# Patient Record
Sex: MALE | Race: WHITE | URBAN - METROPOLITAN AREA
[De-identification: names, ages, dates, MRNs, and addresses within clinical notes are randomized per-mention and may not be internally consistent; named-entity substitution may affect disease eponyms.]

---

## 2017-01-30 DIAGNOSIS — I10 ESSENTIAL HYPERTENSION: Primary | ICD-10-CM

## 2017-01-30 RX ORDER — LISINOPRIL 20 MG/1
20 TABLET ORAL DAILY
Qty: 90 TABLET | Refills: 1 | Status: SHIPPED | OUTPATIENT
Start: 2017-01-30 | End: 2018-04-11

## 2017-02-17 ENCOUNTER — DOCUMENTATION ONLY (OUTPATIENT)
Dept: VASCULAR SURGERY | Facility: CLINIC | Age: 72
End: 2017-02-17

## 2017-04-22 ENCOUNTER — OFFICE VISIT (OUTPATIENT)
Dept: FAMILY MEDICINE | Facility: CLINIC | Age: 72
End: 2017-04-22

## 2017-04-22 DIAGNOSIS — K11.7 XEROSTOMIA: ICD-10-CM

## 2017-04-22 DIAGNOSIS — Z00.00 MEDICARE ANNUAL WELLNESS VISIT, SUBSEQUENT: Primary | ICD-10-CM

## 2017-04-22 DIAGNOSIS — Z13.220 SCREENING CHOLESTEROL LEVEL: ICD-10-CM

## 2017-04-22 DIAGNOSIS — Z12.5 SCREENING FOR PROSTATE CANCER: ICD-10-CM

## 2017-04-22 DIAGNOSIS — I10 ESSENTIAL HYPERTENSION, BENIGN: ICD-10-CM

## 2017-04-22 NOTE — MR AVS SNAPSHOT
After Visit Summary   4/22/2017    Jamison Lyman    MRN: [id number]           Patient Information     Date Of Birth          1945        Visit Information        Provider Department      4/22/2017 8:00 AM Alberto Tomlin MD [facility]        Today's Diagnoses     Medicare annual wellness visit, subsequent    -  1    Screening cholesterol level        Screening for prostate cancer        Xerostomia        Essential hypertension, benign          Care Instructions      Preventive Health Recommendations:       Male Ages 65 and over    Yearly exam:             See your health care provider every year in order to  o   Review health changes.   o   Discuss preventive care.    o   Review your medicines if your doctor has prescribed any.    Talk with your health care provider about whether you should have a test to screen for prostate cancer (PSA).    Every 3 years, have a diabetes test (fasting glucose). If you are at risk for diabetes, you should have this test more often.    Every 5 years, have a cholesterol test. Have this test more often if you are at risk for high cholesterol or heart disease.     Every 10 years, have a colonoscopy. Or, have a yearly FIT test (stool test). These exams will check for colon cancer.    Talk to with your health care provider about screening for Abdominal Aortic Aneurysm if you have a family history of AAA or have a history of smoking.  Shots:     Get a flu shot each year.     Get a tetanus shot every 10 years.     Talk to your doctor about your pneumonia vaccines. There are now two you should receive - Pneumovax (PPSV 23) and Prevnar (PCV 13).    Talk to your doctor about a shingles vaccine.     Talk to your doctor about the hepatitis B vaccine.  Nutrition:     Eat at least 5 servings of fruits and vegetables each day.     Eat whole-grain bread, whole-wheat pasta and brown rice instead of white grains and rice.     Talk to your doctor about Calcium and  Vitamin D.   Lifestyle    Exercise for at least 150 minutes a week (30 minutes a day, 5 days a week). This will help you control your weight and prevent disease.     Limit alcohol to one drink per day.     No smoking.     Wear sunscreen to prevent skin cancer.     See your dentist every six months for an exam and cleaning.     See your eye doctor every 1 to 2 years to screen for conditions such as glaucoma, macular degeneration and cataracts.        Follow-ups after your visit        Follow-up notes from your care team     Return in about 1 year (around 4/22/2018) for Physical Exam.      Who to contact     Please call your clinic at [phone] to:    Ask questions about your health    Make or cancel appointments    Discuss your medicines    Learn about your test results    Speak to your doctor   If you have compliments or concerns about an experience at your clinic, or if you wish to file a complaint, please contact [facility] Patient Relations at [phone] or email us at [email or web address]         Additional Information About Your Visit        [employer] Information     [employer] gives you secure access to your electronic health record. If you see a primary care provider, you can also send messages to your care team and make appointments. If you have questions, please call your primary care clinic.  If you do not have a primary care provider, please call [phone] and they will assist you.      [employer] is an electronic gateway that provides easy, online access to your medical records. With [employer], you can request a clinic appointment, read your test results, renew a prescription or communicate with your care team.     To access your existing account, please contact your [facility] or call [phone] for assistance.        Care EveryWhere ID     This is your Care EveryWhere ID. This could be used by other organizations to access your  [facility] medical records  [id number]         Blood Pressure from Last 3 Encounters:   12/03/16 111/71   11/05/16 123/78   01/14/16 119/80    Weight from Last 3 Encounters:   11/05/16 198 lb (89.8 kg)   01/14/16 196 lb 8 oz (89.1 kg)   02/03/15 199 lb (90.3 kg)               Primary Care Provider Office Phone # Fax #    Alberto Tomlin [phone]       [facility]  [facility] [address] [facility] [address]        Thank you!     Thank you for choosing [facility]  for your care. Our goal is always to provide you with excellent care. Hearing back from our patients is one way we can continue to improve our services. Please take a few minutes to complete the written survey that you may receive in the mail after your visit with us. Thank you!             Your Updated Medication List - Protect others around you: Learn how to safely use, store and throw away your medicines at www.disposemymeds.org.          This list is accurate as of: 4/22/17 11:59 PM.  Always use your most recent med list.                   Brand Name Dispense Instructions for use    ANDROGEL PUMP TD      Place  onto the skin daily as needed.       ascorbic acid 500 MG tablet    VITAMIN C     Take 1 tablet by mouth daily.       aspirin 81 MG tablet      Take 1 tablet by mouth daily.       bromocriptine 2.5 MG tablet    PARLODEL     Take 2 tablets by mouth daily       lisinopril 20 MG tablet    PRINIVIL/ZESTRIL    90 tablet    Take 1 tablet (20 mg) by mouth daily       sildenafil 50 MG cap/tab    VIAGRA    24 tablet    Take 1 tablet (50 mg) by mouth daily as needed for erectile dysfunction       ZANTAC PO      Take 150 mg by mouth

## 2017-04-22 NOTE — PROGRESS NOTES
"CHIEF COMPLAINT:  Medicare annual wellness visit, subsequent.      HISTORY OF PRESENT ILLNESS:  Mr. Lyman is a 71-year-old gentleman here for the above.  He wanted to come in and bring me up-to-date with his musculoskeletal concerns as well as talking to me a bit about his dry mouth.  Since he was going to do that anyway, he was hoping to get a full physical exam.  He has had some left rotator cuff pain for a few months now.  It all started when he was yanking a weed out of his yard.  About 8 months ago, he initially went to some physical therapy but did not take it very seriously.  More recently, for the last 3-1/2 weeks, he has been doing it \"religiously\" and thinks it is making a difference.  He will likely make an appointment with Dr. Nhan Mcfarland or someone in his group to have further assessment done.      He and his wife were in [place] for part of the winter.  He has had some low back pain for a while and that is actually getting better.  He would estimate that it is now 85% of normal.  He has been taking some naproxen almost every day and that really helps.  He and his wife will be starting with a significant remodeling project at their house in about 48 hours, so they have been unusually physically busy.      While they were in [place], sometime in March, Jamison decided to get off his omeprazole which he had been on for years.  He and I had previously discussed this.  He changed over to ranitidine and was taking 150 mg tablets, 2 together twice daily for a 600 mg total.  Not that long after that, he developed a significant dry mouth.  He decided to cut back on the ranitidine on 03/28 and he does not think there has been much of a difference.  He wonders if his dry mouth is due to the medication.      From a Medicare question standpoint, he has no problems with his activities of daily living.  He has had no falls at home.  He is not having any depressive symptoms.  His memory is quite good.      FAMILY " HISTORY UPDATE:  Reminds us that his father  at age 78, not long after being hit by a car, likely from metastatic prostate cancer.      FAMILY HISTORY:  Mother lived to be 91, but  in an apartment fire.      HEALTHCARE MAINTENANCE:  He wears glasses and his eye care is very up-to-date.  Hearing is excellent.  Dental care is up-to-date.  Blood pressure was 133/136-84/85 (the lower readings were the second readings).  He is under 140/90.  We will continue with his lisinopril 20 mg daily.  Colonoscopy was performed in .  It was recommended that he repeat that in 2018.  We will check a PSA 50 at his convenience.  We will also check a lipid panel and a glucose.      REVIEW OF SYSTEMS:  A 10-point review of systems is negative.      SOCIAL AND PAST SURGICAL HISTORY:  Reviewed and unchanged.      OBJECTIVE:  Jamison is in absolutely no distress.  Affect is upbeat.  He is completely alert and oriented x3.  His voice does have a slightly dry eye quality to it.  Examination reveals the following:  Blood pressure readings as mentioned.  He is afebrile.  He is breathing comfortably.  He is clearly comfortable at rest.   HEENT:  Head is normocephalic, atraumatic.  Ears are free of any cerumen.  The TMs look fine.  There is no pain with palpation of the frontal or maxillary sinuses.  Eyes are grossly normal.  Nose is free congestion or discharge.  Teeth in good repair.  Mucous membranes are moist.  Throat looks benign.  Neck is supple without adenopathy or thyromegaly.  No carotid bruits are heard, no JVD.   BACK:  Smooth and straight.  No pain to percussion.  No CVA tenderness.   LUNGS:  Clear to auscultation bilaterally.   HEART:  Regular rate and rhythm without murmur.   ABDOMEN:  Benign.   EXTREMITIES:  Ankles are free of any edema.   SKIN:  Warm and dry.      LABORATORY DATA:  Labs that will be drawn at a later date include a PSA 50, lipid panel and a basic metabolic panel.      ASSESSMENT/PLAN:   1.  Medicare  annual wellness visit, subsequent.   2.  Screening for prostate cancer and hyperlipidemia.   3.  Xerostomia.  Ranitidine has this condition listed as the eighth most common side effect.  Therefore, I am going to encourage him to come off the medication even more.  He will likely take it once nightly rather than twice daily.  If he is having no reflux, he could stop it altogether.  He could always use some omeprazole on an as needed basis, rather than ongoing.  We discussed the rebound effect that he was likely suffering before.  If his mouth dryness is not improved at some point, we could certainly check some Sjogren antibodies.   4.  Left rotator cuff shoulder pain.  Continue with vigorous physical therapy as he has been doing and follow up with Dr. Mcfarland' office at his convenience.   5.  Colonoscopy in 2018.   6.  Meds reviewed in detail.  In the morning, he takes is vitamin C 500 mg daily, lisinopril 20 mg daily and ranitidine 150 mg daily.  At bedtime, he takes aspirin 81 mg daily, bromocriptine 2.5 mg 2 tablets together, and occasional Aleve.

## 2017-06-03 DIAGNOSIS — Z13.220 SCREENING CHOLESTEROL LEVEL: ICD-10-CM

## 2017-06-03 DIAGNOSIS — I10 ESSENTIAL HYPERTENSION, BENIGN: ICD-10-CM

## 2017-06-03 DIAGNOSIS — Z12.5 SCREENING FOR PROSTATE CANCER: ICD-10-CM

## 2017-06-03 LAB
ANION GAP SERPL CALCULATED.3IONS-SCNC: 5 MMOL/L (ref 3–14)
BUN SERPL-MCNC: 29 MG/DL (ref 7–30)
CALCIUM SERPL-MCNC: 8.3 MG/DL (ref 8.5–10.1)
CHLORIDE SERPL-SCNC: 111 MMOL/L (ref 94–109)
CHOLEST SERPL-MCNC: 176 MG/DL
CO2 SERPL-SCNC: 28 MMOL/L (ref 20–32)
CREAT SERPL-MCNC: 1.37 MG/DL (ref 0.66–1.25)
GFR SERPL CREATININE-BSD FRML MDRD: 51 ML/MIN/1.7M2
GLUCOSE SERPL-MCNC: 88 MG/DL (ref 70–99)
HDLC SERPL-MCNC: 41 MG/DL
LDLC SERPL CALC-MCNC: 116 MG/DL
NONHDLC SERPL-MCNC: 135 MG/DL
POTASSIUM SERPL-SCNC: 4.8 MMOL/L (ref 3.4–5.3)
PSA SERPL-ACNC: 2.43 UG/L (ref 0–4)
SODIUM SERPL-SCNC: 143 MMOL/L (ref 133–144)
TRIGL SERPL-MCNC: 95 MG/DL

## 2017-06-19 DIAGNOSIS — M54.50 PAIN OF LUMBAR SPINE: Primary | ICD-10-CM

## 2017-08-17 ENCOUNTER — OFFICE VISIT (OUTPATIENT)
Dept: FAMILY MEDICINE | Facility: CLINIC | Age: 72
End: 2017-08-17

## 2017-08-17 VITALS
SYSTOLIC BLOOD PRESSURE: 121 MMHG | BODY MASS INDEX: 29.17 KG/M2 | DIASTOLIC BLOOD PRESSURE: 76 MMHG | WEIGHT: 203.75 LBS | HEART RATE: 77 BPM | TEMPERATURE: 97.6 F | HEIGHT: 70 IN | OXYGEN SATURATION: 94 %

## 2017-08-17 DIAGNOSIS — G47.00 INSOMNIA, UNSPECIFIED TYPE: ICD-10-CM

## 2017-08-17 DIAGNOSIS — R05.3 PERSISTENT COUGH FOR 3 WEEKS OR LONGER: Primary | ICD-10-CM

## 2017-08-17 RX ORDER — LORAZEPAM 0.5 MG/1
TABLET ORAL
Qty: 90 TABLET | Refills: 1 | Status: SHIPPED | OUTPATIENT
Start: 2017-08-17

## 2017-08-17 RX ORDER — PREDNISONE 20 MG/1
20 TABLET ORAL DAILY
Qty: 20 TABLET | Refills: 1 | Status: SHIPPED | OUTPATIENT
Start: 2017-08-17

## 2017-08-17 ASSESSMENT — PAIN SCALES - GENERAL: PAINLEVEL: NO PAIN (0)

## 2017-08-17 NOTE — MR AVS SNAPSHOT
"              After Visit Summary   8/17/2017    Jamison Lyman    MRN: [id number]           Patient Information     Date Of Birth          1945        Visit Information        Provider Department      8/17/2017 9:20 AM Alberto Tomlin MD [facility]        Today's Diagnoses     Persistent cough for 3 weeks or longer    -  1    Insomnia, unspecified type           Follow-ups after your visit        Who to contact     Please call your clinic at [phone] to:    Ask questions about your health    Make or cancel appointments    Discuss your medicines    Learn about your test results    Speak to your doctor   If you have compliments or concerns about an experience at your clinic, or if you wish to file a complaint, please contact [facility] Patient Relations at [phone] or email us at [email or web address]         Additional Information About Your Visit        MyChart Information     [employer] gives you secure access to your electronic health record. If you see a primary care provider, you can also send messages to your care team and make appointments. If you have questions, please call your primary care clinic.  If you do not have a primary care provider, please call [phone] and they will assist you.      [employer] is an electronic gateway that provides easy, online access to your medical records. With [employer], you can request a clinic appointment, read your test results, renew a prescription or communicate with your care team.     To access your existing account, please contact your [facility] or call [phone] for assistance.        Care EveryWhere ID     This is your Care EveryWhere ID. This could be used by other organizations to access your [facility] medical records  [id number]        Your Vitals Were     Pulse Temperature Height Pulse Oximetry BMI (Body Mass Index)       77 97.6  F (36.4  C) (Oral) 5' 10.47\" (179 cm) " 94% 28.84 kg/m2        Blood Pressure from Last 3 Encounters:   08/17/17 121/76   12/03/16 111/71   11/05/16 123/78    Weight from Last 3 Encounters:   08/17/17 203 lb 12 oz (92.4 kg)   11/05/16 198 lb (89.8 kg)   01/14/16 196 lb 8 oz (89.1 kg)              Today, you had the following     No orders found for display         Today's Medication Changes          These changes are accurate as of: 8/17/17  9:39 AM.  If you have any questions, ask your nurse or doctor.               Start taking these medicines.        Dose/Directions    LORazepam 0.5 MG tablet   Commonly known as:  ATIVAN   Used for:  Insomnia, unspecified type   Started by:  Alberto Tomlin MD        Take 1 to 2 tablets by mouth at bedtime as needed for insomnia.   Quantity:  90 tablet   Refills:  1       predniSONE 20 MG tablet   Commonly known as:  DELTASONE   Used for:  Persistent cough for 3 weeks or longer   Started by:  Alberto Tomlin MD        Dose:  20 mg   Take 1 tablet (20 mg) by mouth daily   Quantity:  20 tablet   Refills:  1            Where to get your medicines      These medications were sent to [facility] [address] [place] [address] [email or web address] [address]  [address], [place] [address]     Phone:  [phone]     predniSONE 20 MG tablet         Some of these will need a paper prescription and others can be bought over the counter.  Ask your nurse if you have questions.     Bring a paper prescription for each of these medications     LORazepam 0.5 MG tablet                Primary Care Provider Office Phone # Fax #    Alberto Tomlin [phone]       [address] [facility]  [facility] [address]        Equal Access to Services     FARHEEN CASAS AH: Roger Lafleur, waenriqueda luqadaha, qaybta kaalmada renae, maria teresa weeks. So [facility] [phone].    ATENCIÓN: Si habla español, tiene a gomez disposición servicios gratuitos  de asistencia lingüística. Hema bang [phone].    We comply with applicable federal civil rights laws and [place] laws. We do not discriminate on the basis of race, color, national origin, age, disability sex, sexual orientation or gender identity.            Thank you!     Thank you for choosing [facility]  for your care. Our goal is always to provide you with excellent care. Hearing back from our patients is one way we can continue to improve our services. Please take a few minutes to complete the written survey that you may receive in the mail after your visit with us. Thank you!             Your Updated Medication List - Protect others around you: Learn how to safely use, store and throw away your medicines at www.disposemymeds.org.          This list is accurate as of: 8/17/17  9:39 AM.  Always use your most recent med list.                   Brand Name Dispense Instructions for use Diagnosis    ANDROGEL PUMP TD      Place  onto the skin daily as needed.        ascorbic acid 500 MG tablet    VITAMIN C     Take 1 tablet by mouth daily.        aspirin 81 MG tablet      Take 1 tablet by mouth daily.        bromocriptine 2.5 MG tablet    PARLODEL     Take 2 tablets by mouth daily        lisinopril 20 MG tablet    PRINIVIL/ZESTRIL    90 tablet    Take 1 tablet (20 mg) by mouth daily    Essential hypertension       LORazepam 0.5 MG tablet    ATIVAN    90 tablet    Take 1 to 2 tablets by mouth at bedtime as needed for insomnia.    Insomnia, unspecified type       predniSONE 20 MG tablet    DELTASONE    20 tablet    Take 1 tablet (20 mg) by mouth daily    Persistent cough for 3 weeks or longer       sildenafil 50 MG cap/tab    VIAGRA    24 tablet    Take 1 tablet (50 mg) by mouth daily as needed for erectile dysfunction    Erectile dysfunction, unspecified erectile dysfunction type       ZANTAC PO      Take 150 mg by mouth

## 2017-08-17 NOTE — NURSING NOTE
"71 year old  Chief Complaint   Patient presents with     Cough     ongoing for a month and its a dry cough       Blood pressure 121/76, pulse 77, temperature 97.6  F (36.4  C), temperature source Oral, height 5' 10.47\" (179 cm), weight 203 lb 12 oz (92.4 kg), SpO2 94 %. Body mass index is 28.84 kg/(m^2).  Patient Active Problem List   Diagnosis     Preventative health care     Pituitary adenoma (H)     Hypertension     Hypotestosteronism     GERD (gastroesophageal reflux disease)     Erectile dysfunction     Left knee pain       Wt Readings from Last 2 Encounters:   08/17/17 203 lb 12 oz (92.4 kg)   11/05/16 198 lb (89.8 kg)     BP Readings from Last 3 Encounters:   08/17/17 121/76   12/03/16 111/71   11/05/16 123/78         Current Outpatient Prescriptions   Medication     lisinopril (PRINIVIL/ZESTRIL) 20 MG tablet     RaNITidine HCl (ZANTAC PO)     sildenafil (VIAGRA) 50 MG tablet     Testosterone (ANDROGEL PUMP TD)     aspirin 81 MG tablet     bromocriptine (PARLODEL) 2.5 MG tablet     ascorbic acid (VITAMIN C) 500 MG tablet     No current facility-administered medications for this visit.        Social History   Substance Use Topics     Smoking status: Former Smoker     Types: Cigarettes     Quit date: 1/1/1978     Smokeless tobacco: Never Used     Alcohol use Yes       Health Maintenance Due   Topic Date Due     ADVANCE DIRECTIVE PLANNING Q5 YRS  10/21/1998     FALL RISK ASSESSMENT  10/21/2008       No results found for: PAP      August 17, 2017 9:18 AM    "

## 2017-08-18 NOTE — PROGRESS NOTES
CHIEF COMPLAINT:  Cough.      HISTORY OF PRESENT ILLNESS:  Jamison is a 71-year-old who has had a dry hacking cough for the better part of a month now.  He has tried a number of over-the-counter medications but none really seemed to help.  During this time, he has been in their house, which is quite dust-filled due to a major remodeling project.  He knows he has a dust mite allergy and suspects he might have a mold allergy, too.  It reminds  him a little bit of going to a used bookstore and he can often start coughing in that environment.      CURRENT MEDICATIONS:  Reviewed.  He is on lisinopril 20 mg once daily, but has been on that for years.  Therefore, I think this dry cough is not related to ACE inhibitor use.  He completely agrees.      He has had no fever.  His cough is completely unproductive.  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  He has had no problem just getting the day.  For the most part, once he is sleeping, he is fine.      OBJECTIVE:  Jamison is in absolutely no distress.  Breathing comfortably.  No audible wheezes or crackles.  He is not diaphoretic.   VITAL SIGNS:  /76 with a heart rate of 77.  Temperature 97.6.  He is 5 feet 10.5 and weighs 203 with a BMI of 28.84.   HEENT:  Examination reveals the following:  No pain with palpation of the frontal or maxillary sinuses.  Nose is free of any congestion.  His ears look fine.  Mucous membranes are moist.  Throat looks entirely benign.  No obvious postnasal drip.   NECK:  Supple without any palpable anterior or posterior chain adenopathy.  No supraclavicular nodes are felt.   LUNGS:  Clear to auscultation bilaterally.  However, with his second inspiration, that triggered a cough.  Absolutely no wheezes, crackles or rhonchi are heard.      ASSESSMENT AND PLAN:   1.  One month of a cough that sounds very inflammatory in nature.  I am going to go ahead and give him prednisone 20 mg once daily for  5 days.  He can go ahead and start later this morning and then take a full 5-day course.  Once that is done, we will see how he is doing.  If he has not responded remarkably at that point, then I would suggest that we go ahead and give him azithromycin, 1 Z-Anil to be used as directed.  This would take care of atypical bacteria.   2.  Call with any problems or questions.

## 2017-08-20 ENCOUNTER — MYC MEDICAL ADVICE (OUTPATIENT)
Dept: FAMILY MEDICINE | Facility: CLINIC | Age: 72
End: 2017-08-20

## 2017-08-28 DIAGNOSIS — J20.9 ACUTE BRONCHITIS WITH SYMPTOMS > 10 DAYS: Primary | ICD-10-CM

## 2017-08-28 RX ORDER — AZITHROMYCIN 250 MG/1
TABLET, FILM COATED ORAL
Qty: 6 TABLET | Refills: 0 | Status: SHIPPED | OUTPATIENT
Start: 2017-08-28

## 2017-08-28 NOTE — TELEPHONE ENCOUNTER
He can try a Z-jag.  THEN he can see a specialist if that doesn't work.  (See thread.)     Diagnosis: Persistent cough and Bronchitis due to bacterial organism--or something like that.     Thanks!     Alberto

## 2017-09-01 DIAGNOSIS — R05.3 COUGH, PERSISTENT: Primary | ICD-10-CM

## 2017-09-01 LAB — RADIOLOGIST FLAGS: NORMAL

## 2017-09-02 ENCOUNTER — OFFICE VISIT (OUTPATIENT)
Dept: FAMILY MEDICINE | Facility: CLINIC | Age: 72
End: 2017-09-02

## 2017-09-02 ENCOUNTER — NURSE TRIAGE (OUTPATIENT)
Dept: NURSING | Facility: CLINIC | Age: 72
End: 2017-09-02

## 2017-09-02 ENCOUNTER — TELEPHONE (OUTPATIENT)
Dept: NURSING | Facility: CLINIC | Age: 72
End: 2017-09-02

## 2017-09-02 VITALS
HEART RATE: 71 BPM | WEIGHT: 204 LBS | DIASTOLIC BLOOD PRESSURE: 74 MMHG | HEIGHT: 71 IN | BODY MASS INDEX: 28.56 KG/M2 | OXYGEN SATURATION: 96 % | RESPIRATION RATE: 12 BRPM | TEMPERATURE: 98.1 F | SYSTOLIC BLOOD PRESSURE: 142 MMHG

## 2017-09-02 DIAGNOSIS — J90 PLEURAL EFFUSION: Primary | ICD-10-CM

## 2017-09-02 DIAGNOSIS — R16.0 LIVER MASS, LEFT LOBE: ICD-10-CM

## 2017-09-02 DIAGNOSIS — N28.89 RIGHT KIDNEY MASS: ICD-10-CM

## 2017-09-02 NOTE — MR AVS SNAPSHOT
After Visit Summary   9/2/2017    Jamison Lyman    MRN: [id number]           Patient Information     Date Of Birth          1945        Visit Information        Provider Department      9/2/2017 8:40 AM Xochitl Perez MD [facility]        Today's Diagnoses     Pleural effusion    -  1       Follow-ups after your visit        Your next 10 appointments already scheduled     Sep 02, 2017 10:20 AM CDT   (Arrive by 10:05 AM)   CT CHEST W/O CONTRAST with UCCT1   [facility] CT ([facility])    [address]  [facility] [id number] [phone]           Please bring any scans or X-rays taken at other hospitals, if similar tests were done. Also bring a list of your medicines, including vitamins, minerals and over-the-counter drugs. It is safest to leave personal items at home.  Be sure to tell your doctor:   If you have any allergies.   If there s any chance you are pregnant.   If you are breastfeeding.   If you have any special needs.  You do not need to do anything special to prepare.  Please wear loose clothing, such as a sweat suit or jogging clothes. Avoid snaps, zippers and other metal. We may ask you to undress and put on a hospital gown.              Who to contact     Please call your clinic at [phone] to:    Ask questions about your health    Make or cancel appointments    Discuss your medicines    Learn about your test results    Speak to your doctor   If you have compliments or concerns about an experience at your clinic, or if you wish to file a complaint, please contact [facility] Patient Relations at [phone] or email us at [email or web address]         Additional Information About Your Visit        MyChart Information     [employer] gives you secure access to your electronic health record. If you see a primary care provider, you can also send messages to your care team  and make appointments. If you have questions, please call your primary care clinic.  If you do not have a primary care provider, please call [phone] and they will assist you.      [employer] is an electronic gateway that provides easy, online access to your medical records. With [employer], you can request a clinic appointment, read your test results, renew a prescription or communicate with your care team.     To access your existing account, please contact your [facility] or call [phone] for assistance.        Care EveryWhere ID     This is your Care EveryWhere ID. This could be used by other organizations to access your [place] medical records  [id number]         Blood Pressure from Last 3 Encounters:   08/17/17 121/76   12/03/16 111/71   11/05/16 123/78    Weight from Last 3 Encounters:   08/17/17 203 lb 12 oz (92.4 kg)   11/05/16 198 lb (89.8 kg)   01/14/16 196 lb 8 oz (89.1 kg)               Primary Care Provider Office Phone # Fax #    Alberto Tomlin [phone] [address] [place] [id number]        Equal Access to Services     JESSY CASAS : Hadii aad ku hadasho Soomaali, waaxda luqadaha, qaybta kaalmada adeegyada, maria teresa weeks. So [facility] [phone].    ATENCIÓN: Si habla español, tiene a gomez disposición servicios gratuitos de asistencia lingüística. [facility] [phone].    We comply with applicable federal civil rights laws and [place] laws. We do not discriminate on the basis of race, color, national origin, age, disability sex, sexual orientation or gender identity.            Thank you!     Thank you for choosing [facility]  for your care. Our goal is always to provide you with excellent care. Hearing back from our patients is one way we can continue to improve our services. Please take a few minutes to complete the written survey that you may receive in the mail after your visit with us.  Thank you!             Your Updated Medication List - Protect others around you: Learn how to safely use, store and throw away your medicines at www.disposemymeds.org.          This list is accurate as of: 9/2/17  9:42 AM.  Always use your most recent med list.                   Brand Name Dispense Instructions for use Diagnosis    ANDROGEL PUMP TD      Place  onto the skin daily as needed.        ascorbic acid 500 MG tablet    VITAMIN C     Take 1 tablet by mouth daily.        aspirin 81 MG tablet      Take 1 tablet by mouth daily.        azithromycin 250 MG tablet    ZITHROMAX    6 tablet    Two tablets first day, then one tablet daily for four days.    Acute bronchitis with symptoms > 10 days       bromocriptine 2.5 MG tablet    PARLODEL     Take 2 tablets by mouth daily        lisinopril 20 MG tablet    PRINIVIL/ZESTRIL    90 tablet    Take 1 tablet (20 mg) by mouth daily    Essential hypertension       LORazepam 0.5 MG tablet    ATIVAN    90 tablet    Take 1 to 2 tablets by mouth at bedtime as needed for insomnia.    Insomnia, unspecified type       predniSONE 20 MG tablet    DELTASONE    20 tablet    Take 1 tablet (20 mg) by mouth daily    Persistent cough for 3 weeks or longer       sildenafil 50 MG cap/tab    VIAGRA    24 tablet    Take 1 tablet (50 mg) by mouth daily as needed for erectile dysfunction    Erectile dysfunction, unspecified erectile dysfunction type       ZANTAC PO      Take 150 mg by mouth

## 2017-09-02 NOTE — TELEPHONE ENCOUNTER
Caller is patient's wife; states he took a call about his tests from  PCP this afternoon and is quite upset; he told wife results but she is not sure he is understanding  it right and would like  it reviewed with her;  gives permission  [facility] EMR  Reviewed and  information in imaging result of CT from provider is read to patient verbatim; basic anatomy explained. Wife states that she and patient both understand.  Reviewed AVS regarding follow up  Advised to call PCP on Tuesday to know where to follow up after  MRI on Thursday  Has no further questions and demonstrates good understanding  Additional Information    [1] Follow-up call to recent contact AND [2] information only call, no triage required    Protocols used: INFORMATION ONLY CALL-ADULT-  Marcia Gastelum RN  [facility]

## 2017-09-02 NOTE — PROGRESS NOTES
"Jamison Lyman is a 71 year old male here for the following issues:    Cough  Jamison is a 72 yo male who is typically in good health. He has a hx of hypertension, GERD and pituitary adenoma (stable with tx using bromocriptine). He is here for evaluation of a persistent dry cough which began in July 2017. He is a  and first noted the cough occurred while he was talking. There is no history of fever, shortness of breath, wheezing or chest pain. No history of asthma or other lung problems. He is a former \"light\" smoker, quit 40 yrs ago. He smoked cigarettes,  \"a few at a time, occasional cigar\".     He was seen in clinic on 8/17 for evaluation. At that time he had tried OTC medication which did not help. He had been remodeling his house and thought initially he was coughing due to all of the dust. No foreign travel. No fever or constitutional symptoms. Weight has been stable (gaining). He was initially given a trial of prednisone 20mg daily x 5 days and this was followed by trial of Azithromycin, 5 day course, which was completed this past week. Neither intervention dramatically changed his symptoms. He is able to lie supine, no interruption of sleep due to cough.     He was referred for a chest x ray which was completed yesterday. He is here with his wife, to discuss results. The Xray shows a  moderate right- sided pleural effusion with associated compressive atelectasis.      Patient Active Problem List   Diagnosis     Preventative health care     Pituitary adenoma (H)     Hypertension     Hypotestosteronism     GERD (gastroesophageal reflux disease)     Erectile dysfunction     Left knee pain       Current Outpatient Prescriptions   Medication Sig Dispense Refill     azithromycin (ZITHROMAX) 250 MG tablet Two tablets first day, then one tablet daily for four days. 6 tablet 0     predniSONE (DELTASONE) 20 MG tablet Take 1 tablet (20 mg) by mouth daily 20 tablet 1     LORazepam (ATIVAN) 0.5 MG tablet Take 1 to " "2 tablets by mouth at bedtime as needed for insomnia. 90 tablet 1     lisinopril (PRINIVIL/ZESTRIL) 20 MG tablet Take 1 tablet (20 mg) by mouth daily 90 tablet 1     RaNITidine HCl (ZANTAC PO) Take 150 mg by mouth       sildenafil (VIAGRA) 50 MG tablet Take 1 tablet (50 mg) by mouth daily as needed for erectile dysfunction 24 tablet 1     Testosterone (ANDROGEL PUMP TD) Place  onto the skin daily as needed.       aspirin 81 MG tablet Take 1 tablet by mouth daily.       bromocriptine (PARLODEL) 2.5 MG tablet Take 2 tablets by mouth daily        ascorbic acid (VITAMIN C) 500 MG tablet Take 1 tablet by mouth daily.         No Known Allergies     EXAM  /74 (BP Location: Left arm, Patient Position: Sitting, Cuff Size: Adult Regular)  Pulse 71  Temp 98.1  F (36.7  C) (Oral)  Resp 12  Ht 5' 10.5\" (179.1 cm)  Wt 204 lb (92.5 kg)  SpO2 96%  BMI 28.86 kg/m2  Gen: Alert, pleasant, NAD, comfortable , speaking in full sentences  HEENT:   OP clear, no posterior erythema  Neck: no LAD  COR: S1,S2, no murmur  Lungs: Clear breath sounds throughout left lung  Decreased breath sounds at right lung with E to A changes along right mid axillary line  No wheezes or rhonchi of either lung, no retractions   Ext: no peripheral edema      Assessment:  (J90) Pleural effusion  (primary encounter diagnosis)  Comment: dry hacking cough, CXR shows right sided pleural effusion. No associated fever, chest pain or shortness of breath  Plan: CT Chest w/o contrast        I spoke with Dr. Jimenez from Pulmonary medicine regarding 's xray. He suggested non contrast chest CT to better characterize effusion. No clinical fever or symptoms to warrant antibiotics at this time.  Discussed plan with the patient and his wife. They understand we are looking for malignant vs nonmalignant sources of this effusion.    ADDENDUM  CT scan of chest was performed directly after clinic visit today.   In summary, there is multifocal pleural based " nodularity predominantly on the right but with left hemithorax involvement as well. Moderated right sided pleural effusion. Findings concerning for malignant effusion.  4.3cm liver mass at left hepatic lobe. Mass containing fat at the upper pole of the right kidney. CT or MRI recommended to further characterize.     I spoke with Mr. Lyman and his wife regarding the CT findings. They understand he will likely need biopsy of pleural lesion(s) and /or tapping of pleural effusion for analysis, cytology. Dr. Jimenez indicated he would notify the pulmonary clinic staff to help expedite an appt for this patient.  (This is a holiday weekend).   I informed the patient I would order MRI of abdomen and pelvis, as it is unclear if the masses seen are benign, malignant or metastatic process.  The patient will call to schedule his MRI scan.     (R16.0) Liver mass, left lobe  Comment: left hepatic lobe  Plan: MR Abdomen & Pelvis w/o & w Contrast        As above, MRI ordered, pt informed of findings    (N28.89) Right kidney mass  Comment: upper pole of right kidney  Plan: MR Abdomen & Pelvis w/o & w Contrast        As above, pt informed of findings.     Total visit time today 40 minutes.  More than 50% of the time was spent in counseling and coordination of care regarding pleural effusion.       Xochitl Perez MD  Internal Medicine/Pediatrics

## 2017-09-05 ENCOUNTER — TRANSFERRED RECORDS (OUTPATIENT)
Dept: HEALTH INFORMATION MANAGEMENT | Facility: CLINIC | Age: 72
End: 2017-09-05

## 2017-09-07 ENCOUNTER — HOSPITAL ENCOUNTER (OUTPATIENT)
Dept: MRI IMAGING | Facility: CLINIC | Age: 72
Discharge: HOME | End: 2017-09-07
Attending: INTERNAL MEDICINE | Admitting: INTERNAL MEDICINE
Payer: COMMERCIAL

## 2017-09-07 DIAGNOSIS — R16.0 LIVER MASS, LEFT LOBE: ICD-10-CM

## 2017-09-07 DIAGNOSIS — N28.89 RIGHT KIDNEY MASS: ICD-10-CM

## 2017-09-07 LAB
CREAT BLD-MCNC: 1.3 MG/DL (ref 0.66–1.25)
GFR SERPL CREATININE-BSD FRML MDRD: 54 ML/MIN/1.7M2

## 2017-09-07 PROCEDURE — 25500064 ZZH RX 255 OP 636: Performed by: INTERNAL MEDICINE

## 2017-09-07 PROCEDURE — 74183 MRI ABD W/O CNTR FLWD CNTR: CPT

## 2017-09-07 PROCEDURE — A9581 GADOXETATE DISODIUM INJ: HCPCS | Performed by: INTERNAL MEDICINE

## 2017-09-07 PROCEDURE — 82565 ASSAY OF CREATININE: CPT

## 2017-09-07 RX ADMIN — GADOXETATE DISODIUM 10 ML: 181.43 INJECTION, SOLUTION INTRAVENOUS at 17:38

## 2017-09-19 ENCOUNTER — ALLIED HEALTH/NURSE VISIT (OUTPATIENT)
Dept: FAMILY MEDICINE | Facility: CLINIC | Age: 72
End: 2017-09-19

## 2017-09-19 DIAGNOSIS — Z23 NEEDS FLU SHOT: Primary | ICD-10-CM

## 2017-09-19 NOTE — MR AVS SNAPSHOT
After Visit Summary   9/19/2017    Jamison Lyman    MRN: [id number]           Patient Information     Date Of Birth          1945        Visit Information        Provider Department      9/19/2017 3:15 PM Nurse, [facility]        Today's Diagnoses     Needs flu shot    -  1       Follow-ups after your visit        Who to contact     Please call your clinic at [phone] to:    Ask questions about your health    Make or cancel appointments    Discuss your medicines    Learn about your test results    Speak to your doctor   If you have compliments or concerns about an experience at your clinic, or if you wish to file a complaint, please contact [facility] Patient Relations at [phone] or email us at [email or web address]         Additional Information About Your Visit        MyChart Information     [employer] gives you secure access to your electronic health record. If you see a primary care provider, you can also send messages to your care team and make appointments. If you have questions, please call your primary care clinic.  If you do not have a primary care provider, please call [phone] and they will assist you.      [employer] is an electronic gateway that provides easy, online access to your medical records. With [employer], you can request a clinic appointment, read your test results, renew a prescription or communicate with your care team.     To access your existing account, please contact your [facility] or call [phone] for assistance.        Care EveryWhere ID     This is your Care EveryWhere ID. This could be used by other organizations to access your [place] medical records  [id number]         Blood Pressure from Last 3 Encounters:   09/02/17 142/74   08/17/17 121/76   12/03/16 111/71    Weight from Last 3 Encounters:   09/02/17 204 lb (92.5 kg)   08/17/17 203 lb 12 oz (92.4 kg)   11/05/16 198  lb (89.8 kg)              We Performed the Following     ADMIN INFLUENZA VIRUS VACCINE     HC FLU VACCINE, INCREASED ANTIGEN, PRESV FREE        Primary Care Provider Office Phone # Fax #    Alberto Tomlin [phone] [address] [place] [address]        Equal Access to Services     JESSY CASAS : Hadii aad ku hadasho Soomaali, waaxda luqadaha, qaybta kaalmada adeegyada, waxay idiin hayaan adekait boswellmarycaitlin cunningham . So [facility] [phone].    ATENCIÓN: Si habla español, tiene a gomez disposición servicios gratuitos de asistencia lingüística. [facility] [phone].    We comply with applicable federal civil rights laws and [place] laws. We do not discriminate on the basis of race, color, national origin, age, disability sex, sexual orientation or gender identity.            Thank you!     Thank you for choosing [facility]  for your care. Our goal is always to provide you with excellent care. Hearing back from our patients is one way we can continue to improve our services. Please take a few minutes to complete the written survey that you may receive in the mail after your visit with us. Thank you!             Your Updated Medication List - Protect others around you: Learn how to safely use, store and throw away your medicines at www.disposemymeds.org.          This list is accurate as of: 9/19/17  3:19 PM.  Always use your most recent med list.                   Brand Name Dispense Instructions for use Diagnosis    ANDROGEL PUMP TD      Place  onto the skin daily as needed.        ascorbic acid 500 MG tablet    VITAMIN C     Take 1 tablet by mouth daily.        aspirin 81 MG tablet      Take 1 tablet by mouth daily.        azithromycin 250 MG tablet    ZITHROMAX    6 tablet    Two tablets first day, then one tablet daily for four days.    Acute bronchitis with symptoms > 10 days       bromocriptine 2.5 MG tablet    PARLODEL     Take 2 tablets by mouth daily        lisinopril 20  MG tablet    PRINIVIL/ZESTRIL    90 tablet    Take 1 tablet (20 mg) by mouth daily    Essential hypertension       LORazepam 0.5 MG tablet    ATIVAN    90 tablet    Take 1 to 2 tablets by mouth at bedtime as needed for insomnia.    Insomnia, unspecified type       predniSONE 20 MG tablet    DELTASONE    20 tablet    Take 1 tablet (20 mg) by mouth daily    Persistent cough for 3 weeks or longer       sildenafil 50 MG tablet    VIAGRA    24 tablet    Take 1 tablet (50 mg) by mouth daily as needed for erectile dysfunction    Erectile dysfunction, unspecified erectile dysfunction type       ZANTAC PO      Take 150 mg by mouth

## 2017-09-19 NOTE — NURSING NOTE
Injectable Influenza Immunization Documentation      1.  Has the patient received the information for the injectable influenza vaccine? YES    2. Is the patient 6 months of age or older? YES    3. Does the patient have any of the following contraindications?          Severe allergy to eggs? No     Severe allergic reaction to previous influenza vaccines? No     Allergy to contact lens solution/thimerosol? No     History of Guillain-Melville syndrome? No     Undergoing chemotherapy or radiation therapy?       (vaccine should be given at least 2 weeks prior or 3 weeks after)  No     Currently have moderate or severe illness? No         4.  The vaccine has been administered and the patient was instructed to wait 15 minutes before leaving the building in the event of an allergic reaction: YES    Vaccination given by Haylie Novak CMA  September 19, 2017 3:18 PM        Jamison Dooly comes into clinic today at the request of Dr. Tomlin Ordering Provider for Flu shot.        This service provided today was under the supervising provider of the day Dr. Tomlin, who was available if needed.    Haylie Novak

## 2017-09-27 ENCOUNTER — TRANSFERRED RECORDS (OUTPATIENT)
Dept: HEALTH INFORMATION MANAGEMENT | Facility: CLINIC | Age: 72
End: 2017-09-27

## 2017-10-19 ENCOUNTER — TRANSFERRED RECORDS (OUTPATIENT)
Dept: HEALTH INFORMATION MANAGEMENT | Facility: CLINIC | Age: 72
End: 2017-10-19

## 2017-11-10 ENCOUNTER — TRANSFERRED RECORDS (OUTPATIENT)
Dept: HEALTH INFORMATION MANAGEMENT | Facility: CLINIC | Age: 72
End: 2017-11-10

## 2017-12-01 ENCOUNTER — TRANSFERRED RECORDS (OUTPATIENT)
Dept: HEALTH INFORMATION MANAGEMENT | Facility: CLINIC | Age: 72
End: 2017-12-01

## 2017-12-26 ENCOUNTER — TRANSFERRED RECORDS (OUTPATIENT)
Dept: HEALTH INFORMATION MANAGEMENT | Facility: CLINIC | Age: 72
End: 2017-12-26

## 2017-12-27 ENCOUNTER — TRANSFERRED RECORDS (OUTPATIENT)
Dept: HEALTH INFORMATION MANAGEMENT | Facility: CLINIC | Age: 72
End: 2017-12-27

## 2018-01-15 ENCOUNTER — TRANSFERRED RECORDS (OUTPATIENT)
Dept: HEALTH INFORMATION MANAGEMENT | Facility: CLINIC | Age: 73
End: 2018-01-15

## 2018-02-06 ENCOUNTER — TRANSFERRED RECORDS (OUTPATIENT)
Dept: HEALTH INFORMATION MANAGEMENT | Facility: CLINIC | Age: 73
End: 2018-02-06

## 2018-02-27 ENCOUNTER — TRANSFERRED RECORDS (OUTPATIENT)
Dept: HEALTH INFORMATION MANAGEMENT | Facility: CLINIC | Age: 73
End: 2018-02-27

## 2018-03-21 ENCOUNTER — TRANSFERRED RECORDS (OUTPATIENT)
Dept: HEALTH INFORMATION MANAGEMENT | Facility: CLINIC | Age: 73
End: 2018-03-21

## 2018-03-22 ENCOUNTER — TRANSFERRED RECORDS (OUTPATIENT)
Dept: HEALTH INFORMATION MANAGEMENT | Facility: CLINIC | Age: 73
End: 2018-03-22

## 2018-03-24 ENCOUNTER — TRANSFERRED RECORDS (OUTPATIENT)
Dept: HEALTH INFORMATION MANAGEMENT | Facility: CLINIC | Age: 73
End: 2018-03-24

## 2018-03-26 ENCOUNTER — NURSE TRIAGE (OUTPATIENT)
Dept: NURSING | Facility: CLINIC | Age: 73
End: 2018-03-26

## 2018-03-27 NOTE — TELEPHONE ENCOUNTER
"  [facility]NA Triage Call  Presenting Problem:Wife calling,  (Jamison) had surgery (lung cancer) 4 days ago at [facility](this is also where patient's oncologist in from). \"He's been very lethargic and he's not taking the pain medication now. How long does this last?\"   Patient Recommendations/Teaching:I advised caller to call the on call surgeon for [facility].   Jana Sanders RN [facility]          "

## 2018-04-11 ENCOUNTER — TRANSFERRED RECORDS (OUTPATIENT)
Dept: HEALTH INFORMATION MANAGEMENT | Facility: CLINIC | Age: 73
End: 2018-04-11

## 2018-04-11 DIAGNOSIS — I10 ESSENTIAL HYPERTENSION: ICD-10-CM

## 2018-04-11 RX ORDER — LISINOPRIL 20 MG/1
20 TABLET ORAL DAILY
Qty: 30 TABLET | Refills: 1 | Status: SHIPPED | OUTPATIENT
Start: 2018-04-11

## 2018-04-11 NOTE — TELEPHONE ENCOUNTER
Last visit 9/2/17  Filling short supply for pt due to travel and forgetting med. Has recent needed labs from [facility] in chart.

## 2018-05-02 ENCOUNTER — TRANSFERRED RECORDS (OUTPATIENT)
Dept: HEALTH INFORMATION MANAGEMENT | Facility: CLINIC | Age: 73
End: 2018-05-02

## 2018-05-23 ENCOUNTER — TRANSFERRED RECORDS (OUTPATIENT)
Dept: HEALTH INFORMATION MANAGEMENT | Facility: CLINIC | Age: 73
End: 2018-05-23

## 2018-07-05 ENCOUNTER — TRANSFERRED RECORDS (OUTPATIENT)
Dept: HEALTH INFORMATION MANAGEMENT | Facility: CLINIC | Age: 73
End: 2018-07-05

## 2018-07-26 ENCOUNTER — TRANSFERRED RECORDS (OUTPATIENT)
Dept: HEALTH INFORMATION MANAGEMENT | Facility: CLINIC | Age: 73
End: 2018-07-26

## 2018-10-10 ENCOUNTER — TRANSFERRED RECORDS (OUTPATIENT)
Dept: HEALTH INFORMATION MANAGEMENT | Facility: CLINIC | Age: 73
End: 2018-10-10

## 2018-10-24 ENCOUNTER — TRANSFERRED RECORDS (OUTPATIENT)
Dept: HEALTH INFORMATION MANAGEMENT | Facility: CLINIC | Age: 73
End: 2018-10-24

## 2019-02-14 ENCOUNTER — TELEPHONE (OUTPATIENT)
Dept: FAMILY MEDICINE | Facility: CLINIC | Age: 74
End: 2019-02-14

## 2019-02-14 NOTE — TELEPHONE ENCOUNTER
Noted that pt has not been seen in clinic since Sept 2017 - LM for [facility] re; this - they can forward orders for approval here, if needed. Otherwise, they can send orders, info, to [facility], where pt is followed closely.  He is to call back with questions.  Claribel Wood RN  [facility]

## 2019-02-14 NOTE — TELEPHONE ENCOUNTER
Health Call Center    Phone Message    May a detailed message be left on voicemail: yes, Domingo, Nurse with [facility] would like a call back at [phone] with Verbal okay for Pts request.      Reason for Call: Order(s): Home Care Orders: Other: Pt is requesting Private Pay Home Health Aid Services, 0-24 Hours per day. Domingo also wanted Doctor to know as an FYI, Pt is going to be open to [facility] 2/15/2019.    Action Taken: Message routed to:  [facility]: [facility] NURSE POOL

## 2019-02-19 ENCOUNTER — DOCUMENTATION ONLY (OUTPATIENT)
Dept: OTHER | Facility: CLINIC | Age: 74
End: 2019-02-19

## 2019-09-30 ENCOUNTER — HEALTH MAINTENANCE LETTER (OUTPATIENT)
Age: 74
End: 2019-09-30

## 2020-03-15 ENCOUNTER — HEALTH MAINTENANCE LETTER (OUTPATIENT)
Age: 75
End: 2020-03-15

## 2021-01-15 ENCOUNTER — HEALTH MAINTENANCE LETTER (OUTPATIENT)
Age: 76
End: 2021-01-15

## 2021-02-22 ENCOUNTER — HOSPITAL ENCOUNTER (EMERGENCY)
Facility: HOSPITAL | Age: 76
End: 2021-02-22

## 2021-05-09 ENCOUNTER — HEALTH MAINTENANCE LETTER (OUTPATIENT)
Age: 76
End: 2021-05-09

## 2021-10-24 ENCOUNTER — HEALTH MAINTENANCE LETTER (OUTPATIENT)
Age: 76
End: 2021-10-24

## 2022-06-05 ENCOUNTER — HEALTH MAINTENANCE LETTER (OUTPATIENT)
Age: 77
End: 2022-06-05

## 2022-10-15 ENCOUNTER — HEALTH MAINTENANCE LETTER (OUTPATIENT)
Age: 77
End: 2022-10-15

## 2023-06-11 ENCOUNTER — HEALTH MAINTENANCE LETTER (OUTPATIENT)
Age: 78
End: 2023-06-11